# Patient Record
Sex: MALE | Race: WHITE | URBAN - METROPOLITAN AREA
[De-identification: names, ages, dates, MRNs, and addresses within clinical notes are randomized per-mention and may not be internally consistent; named-entity substitution may affect disease eponyms.]

---

## 2017-02-17 ENCOUNTER — HISTORICAL (OUTPATIENT)
Dept: LAB | Facility: HOSPITAL | Age: 77
End: 2017-02-17

## 2017-03-02 ENCOUNTER — HISTORICAL (OUTPATIENT)
Dept: RADIOLOGY | Facility: HOSPITAL | Age: 77
End: 2017-03-02

## 2017-08-11 ENCOUNTER — HISTORICAL (OUTPATIENT)
Dept: LAB | Facility: HOSPITAL | Age: 77
End: 2017-08-11

## 2017-08-11 LAB
ALBUMIN SERPL-MCNC: 4.1 GM/DL (ref 3.4–5)
ALBUMIN/GLOB SERPL: 1.3 RATIO (ref 1.1–2)
ALP SERPL-CCNC: 89 UNIT/L (ref 46–116)
ALT SERPL-CCNC: 33 UNIT/L (ref 12–78)
AST SERPL-CCNC: 20 UNIT/L (ref 15–37)
BILIRUB SERPL-MCNC: 1.1 MG/DL (ref 0.2–1)
BILIRUBIN DIRECT+TOT PNL SERPL-MCNC: 0.23 MG/DL (ref 0–0.2)
BILIRUBIN DIRECT+TOT PNL SERPL-MCNC: 0.87 MG/DL (ref 0–0.8)
BUN SERPL-MCNC: 22 MG/DL (ref 7–18)
CALCIUM SERPL-MCNC: 9.4 MG/DL (ref 8.5–10.1)
CHLORIDE SERPL-SCNC: 105 MMOL/L (ref 98–107)
CHOLEST SERPL-MCNC: 140 MG/DL (ref 0–200)
CHOLEST/HDLC SERPL: 2.9 {RATIO} (ref 0–5)
CO2 SERPL-SCNC: 28.9 MMOL/L (ref 21–32)
CREAT SERPL-MCNC: 1.35 MG/DL (ref 0.6–1.3)
EST. AVERAGE GLUCOSE BLD GHB EST-MCNC: 120 MG/DL
GLOBULIN SER-MCNC: 3.1 GM/DL (ref 2.4–3.5)
GLUCOSE SERPL-MCNC: 100 MG/DL (ref 74–106)
HBA1C MFR BLD: 5.8 % (ref 4.5–6.2)
HDLC SERPL-MCNC: 49 MG/DL (ref 40–60)
LDLC SERPL CALC-MCNC: 72 MG/DL (ref 0–129)
POTASSIUM SERPL-SCNC: 4.4 MMOL/L (ref 3.5–5.1)
PROT SERPL-MCNC: 7.2 GM/DL (ref 6.4–8.2)
SODIUM SERPL-SCNC: 142 MMOL/L (ref 136–145)
TRIGL SERPL-MCNC: 93 MG/DL
VLDLC SERPL CALC-MCNC: 19 MG/DL

## 2017-11-10 ENCOUNTER — HISTORICAL (OUTPATIENT)
Dept: LAB | Facility: HOSPITAL | Age: 77
End: 2017-11-10

## 2017-11-10 LAB
EST. AVERAGE GLUCOSE BLD GHB EST-MCNC: 114 MG/DL
HBA1C MFR BLD: 5.6 % (ref 4.5–6.2)
PSA SERPL-MCNC: 0.39 NG/ML (ref 0–4)

## 2018-02-15 ENCOUNTER — HISTORICAL (OUTPATIENT)
Dept: LAB | Facility: HOSPITAL | Age: 78
End: 2018-02-15

## 2018-02-15 LAB
ALBUMIN SERPL-MCNC: 3.9 GM/DL (ref 3.4–5)
ALBUMIN/GLOB SERPL: 1.1 RATIO (ref 1.1–2)
ALP SERPL-CCNC: 126 UNIT/L (ref 46–116)
ALT SERPL-CCNC: 16 UNIT/L (ref 12–78)
AST SERPL-CCNC: 19 UNIT/L (ref 15–37)
BILIRUB SERPL-MCNC: 0.9 MG/DL (ref 0.2–1)
BILIRUBIN DIRECT+TOT PNL SERPL-MCNC: 0.24 MG/DL (ref 0–0.2)
BILIRUBIN DIRECT+TOT PNL SERPL-MCNC: 0.65 MG/DL (ref 0–0.8)
BUN SERPL-MCNC: 26.8 MG/DL (ref 7–18)
CALCIUM SERPL-MCNC: 9.5 MG/DL (ref 8.5–10.1)
CHLORIDE SERPL-SCNC: 103 MMOL/L (ref 98–107)
CHOLEST SERPL-MCNC: 131 MG/DL (ref 0–200)
CHOLEST/HDLC SERPL: 2.8 {RATIO} (ref 0–5)
CO2 SERPL-SCNC: 29.1 MMOL/L (ref 21–32)
CREAT SERPL-MCNC: 1.39 MG/DL (ref 0.6–1.3)
EST. AVERAGE GLUCOSE BLD GHB EST-MCNC: 140 MG/DL
GLOBULIN SER-MCNC: 3.4 GM/DL (ref 2.4–3.5)
GLUCOSE SERPL-MCNC: 137 MG/DL (ref 74–106)
HBA1C MFR BLD: 6.5 % (ref 4.5–6.2)
HDLC SERPL-MCNC: 46 MG/DL (ref 40–60)
LDLC SERPL CALC-MCNC: 62 MG/DL (ref 0–129)
POTASSIUM SERPL-SCNC: 4.4 MMOL/L (ref 3.5–5.1)
PROT SERPL-MCNC: 7.3 GM/DL (ref 6.4–8.2)
SODIUM SERPL-SCNC: 140 MMOL/L (ref 136–145)
TRIGL SERPL-MCNC: 116 MG/DL
VLDLC SERPL CALC-MCNC: 23 MG/DL

## 2018-05-07 ENCOUNTER — HISTORICAL (OUTPATIENT)
Dept: LAB | Facility: HOSPITAL | Age: 78
End: 2018-05-07

## 2018-05-07 LAB
ABS NEUT (OLG): 8.1 X10(3)/MCL (ref 2.1–9.2)
ALBUMIN SERPL-MCNC: 3.5 GM/DL (ref 3.4–5)
ALBUMIN/GLOB SERPL: 0.9 RATIO (ref 1.1–2)
ALP SERPL-CCNC: 116 UNIT/L (ref 46–116)
ALT SERPL-CCNC: 17 UNIT/L (ref 12–78)
AST SERPL-CCNC: 16 UNIT/L (ref 15–37)
BASOPHILS NFR BLD AUTO: 0 % (ref 0–2)
BILIRUB SERPL-MCNC: 0.7 MG/DL (ref 0.2–1)
BILIRUBIN DIRECT+TOT PNL SERPL-MCNC: 0.19 MG/DL (ref 0–0.2)
BILIRUBIN DIRECT+TOT PNL SERPL-MCNC: 0.51 MG/DL (ref 0–0.8)
BUN SERPL-MCNC: 18.8 MG/DL (ref 7–18)
CALCIUM SERPL-MCNC: 9.6 MG/DL (ref 8.5–10.1)
CHLORIDE SERPL-SCNC: 101 MMOL/L (ref 98–107)
CO2 SERPL-SCNC: 26.3 MMOL/L (ref 21–32)
CREAT SERPL-MCNC: 1.33 MG/DL (ref 0.6–1.3)
EOSINOPHIL # BLD AUTO: 0.3 X10(3)/MCL
EOSINOPHIL NFR BLD AUTO: 2 %
ERYTHROCYTE [DISTWIDTH] IN BLOOD BY AUTOMATED COUNT: 12.7 % (ref 11.5–17)
GLOBULIN SER-MCNC: 3.7 GM/DL (ref 2.4–3.5)
GLUCOSE SERPL-MCNC: 148 MG/DL (ref 74–106)
HCT VFR BLD AUTO: 36.9 % (ref 42–52)
HGB BLD-MCNC: 12.3 GM/DL (ref 14–18)
LYMPHOCYTES # BLD AUTO: 1.9 X10(3)/MCL
LYMPHOCYTES NFR BLD AUTO: 17 % (ref 13–40)
MCH RBC QN AUTO: 29.3 PG (ref 27–31)
MCHC RBC AUTO-ENTMCNC: 33.3 GM/DL (ref 33–36)
MCV RBC AUTO: 87.9 FL (ref 80–94)
MONOCYTES # BLD AUTO: 1.3 X10(3)/MCL
MONOCYTES NFR BLD AUTO: 12 % (ref 2–11)
NEUTROPHILS # BLD AUTO: 8.1 X10(3)/MCL (ref 2.1–9.2)
NEUTROPHILS NFR BLD AUTO: 69 % (ref 47–80)
PLATELET # BLD AUTO: 248 X10(3)/MCL (ref 130–400)
PMV BLD AUTO: 7.7 FL (ref 7.4–10.4)
POTASSIUM SERPL-SCNC: 4.4 MMOL/L (ref 3.5–5.1)
PROT SERPL-MCNC: 7.2 GM/DL (ref 6.4–8.2)
RBC # BLD AUTO: 4.2 X10(6)/MCL (ref 4.7–6.1)
SODIUM SERPL-SCNC: 140 MMOL/L (ref 136–145)
WBC # SPEC AUTO: 11.7 X10(3)/MCL (ref 4.5–11.5)

## 2018-05-18 ENCOUNTER — HISTORICAL (OUTPATIENT)
Dept: RADIOLOGY | Facility: HOSPITAL | Age: 78
End: 2018-05-18

## 2018-06-04 ENCOUNTER — HISTORICAL (OUTPATIENT)
Dept: LAB | Facility: HOSPITAL | Age: 78
End: 2018-06-04

## 2018-06-04 LAB
ALBUMIN SERPL-MCNC: 3.2 GM/DL (ref 3.4–5)
ALBUMIN/GLOB SERPL: 0.8 RATIO (ref 1.1–2)
ALP SERPL-CCNC: 100 UNIT/L (ref 46–116)
ALT SERPL-CCNC: 17 UNIT/L (ref 12–78)
AST SERPL-CCNC: 17 UNIT/L (ref 15–37)
BILIRUB SERPL-MCNC: 0.7 MG/DL (ref 0.2–1)
BILIRUBIN DIRECT+TOT PNL SERPL-MCNC: 0.2 MG/DL (ref 0–0.2)
BILIRUBIN DIRECT+TOT PNL SERPL-MCNC: 0.5 MG/DL (ref 0–0.8)
BUN SERPL-MCNC: 13 MG/DL (ref 7–18)
CALCIUM SERPL-MCNC: 9.4 MG/DL (ref 8.5–10.1)
CHLORIDE SERPL-SCNC: 102 MMOL/L (ref 98–107)
CO2 SERPL-SCNC: 29.1 MMOL/L (ref 21–32)
CREAT SERPL-MCNC: 1.23 MG/DL (ref 0.6–1.3)
ERYTHROCYTE [DISTWIDTH] IN BLOOD BY AUTOMATED COUNT: 12.9 % (ref 11.5–17)
EST. AVERAGE GLUCOSE BLD GHB EST-MCNC: 146 MG/DL
GLOBULIN SER-MCNC: 3.9 GM/DL (ref 2.4–3.5)
GLUCOSE SERPL-MCNC: 122 MG/DL (ref 74–106)
HBA1C MFR BLD: 6.7 % (ref 4.5–6.2)
HCT VFR BLD AUTO: 34.3 % (ref 42–52)
HGB BLD-MCNC: 11.2 GM/DL (ref 14–18)
MCH RBC QN AUTO: 28.5 PG (ref 27–31)
MCHC RBC AUTO-ENTMCNC: 32.7 GM/DL (ref 33–36)
MCV RBC AUTO: 87 FL (ref 80–94)
PLATELET # BLD AUTO: 315 X10(3)/MCL (ref 130–400)
PMV BLD AUTO: 7.4 FL (ref 7.4–10.4)
POTASSIUM SERPL-SCNC: 4.5 MMOL/L (ref 3.5–5.1)
PROT SERPL-MCNC: 7.1 GM/DL (ref 6.4–8.2)
RBC # BLD AUTO: 3.94 X10(6)/MCL (ref 4.7–6.1)
SODIUM SERPL-SCNC: 140 MMOL/L (ref 136–145)
TSH SERPL-ACNC: 4.23 MIU/ML (ref 0.36–3.74)
WBC # SPEC AUTO: 11.8 X10(3)/MCL (ref 4.5–11.5)

## 2018-06-08 ENCOUNTER — HISTORICAL (OUTPATIENT)
Dept: CARDIOLOGY | Facility: HOSPITAL | Age: 78
End: 2018-06-08

## 2018-06-08 LAB
APTT PPP: 36.8 SECOND(S) (ref 24.8–36.9)
INR PPP: 1.17 (ref 0–1.27)
PROTHROMBIN TIME: 15.3 SECOND(S) (ref 12.2–14.7)

## 2018-06-19 ENCOUNTER — HISTORICAL (OUTPATIENT)
Dept: ADMINISTRATIVE | Facility: HOSPITAL | Age: 78
End: 2018-06-19

## 2018-06-19 LAB
ABS NEUT (OLG): 8.32 X10(3)/MCL (ref 2.1–9.2)
ALBUMIN SERPL-MCNC: 3.2 GM/DL (ref 3.4–5)
ALBUMIN/GLOB SERPL: 0.9 {RATIO}
ALP SERPL-CCNC: 97 UNIT/L (ref 50–136)
ALT SERPL-CCNC: 23 UNIT/L (ref 12–78)
AST SERPL-CCNC: 17 UNIT/L (ref 15–37)
BASOPHILS # BLD AUTO: 0 X10(3)/MCL (ref 0–0.2)
BASOPHILS NFR BLD AUTO: 0.2 %
BILIRUB SERPL-MCNC: 0.7 MG/DL (ref 0.2–1)
BILIRUBIN DIRECT+TOT PNL SERPL-MCNC: 0.2 MG/DL (ref 0–0.2)
BILIRUBIN DIRECT+TOT PNL SERPL-MCNC: 0.5 MG/DL (ref 0–0.8)
BUN SERPL-MCNC: 17 MG/DL (ref 7–18)
CALCIUM SERPL-MCNC: 9.6 MG/DL (ref 8.5–10.1)
CHLORIDE SERPL-SCNC: 103 MMOL/L (ref 98–107)
CO2 SERPL-SCNC: 27 MMOL/L (ref 21–32)
CREAT SERPL-MCNC: 1.11 MG/DL (ref 0.7–1.3)
EOSINOPHIL # BLD AUTO: 0.1 X10(3)/MCL (ref 0–0.9)
EOSINOPHIL NFR BLD AUTO: 1 %
ERYTHROCYTE [DISTWIDTH] IN BLOOD BY AUTOMATED COUNT: 12.8 % (ref 11.5–17)
GLOBULIN SER-MCNC: 3.6 GM/DL (ref 2.4–3.5)
GLUCOSE SERPL-MCNC: 108 MG/DL (ref 74–106)
HCT VFR BLD AUTO: 36.9 % (ref 42–52)
HGB BLD-MCNC: 11 GM/DL (ref 14–18)
LDH SERPL-CCNC: 281 UNIT/L (ref 87–241)
LYMPHOCYTES # BLD AUTO: 1.8 X10(3)/MCL (ref 0.6–4.6)
LYMPHOCYTES NFR BLD AUTO: 15.6 %
MAGNESIUM SERPL-MCNC: 2.2 MG/DL (ref 1.8–2.4)
MCH RBC QN AUTO: 27.6 PG (ref 27–31)
MCHC RBC AUTO-ENTMCNC: 29.8 GM/DL (ref 33–36)
MCV RBC AUTO: 92.7 FL (ref 80–94)
MONOCYTES # BLD AUTO: 1.2 X10(3)/MCL (ref 0.1–1.3)
MONOCYTES NFR BLD AUTO: 10.9 %
NEUTROPHILS # BLD AUTO: 8.3 X10(3)/MCL (ref 2.1–9.2)
NEUTROPHILS NFR BLD AUTO: 72.3 %
PHOSPHATE SERPL-MCNC: 3.8 MG/DL (ref 2.5–4.9)
PLATELET # BLD AUTO: 264 X10(3)/MCL (ref 130–400)
PMV BLD AUTO: 9.9 FL (ref 9.4–12.4)
POTASSIUM SERPL-SCNC: 4.1 MMOL/L (ref 3.5–5.1)
PROT SERPL-MCNC: 6.8 GM/DL (ref 6.4–8.2)
RBC # BLD AUTO: 3.98 X10(6)/MCL (ref 4.7–6.1)
SODIUM SERPL-SCNC: 142 MMOL/L (ref 136–145)
URATE SERPL-MCNC: 6.5 MG/DL (ref 2.6–7.2)
WBC # SPEC AUTO: 11.5 X10(3)/MCL (ref 4.5–11.5)

## 2018-06-21 ENCOUNTER — HISTORICAL (OUTPATIENT)
Dept: LAB | Facility: HOSPITAL | Age: 78
End: 2018-06-21

## 2018-06-21 ENCOUNTER — HISTORICAL (OUTPATIENT)
Dept: ADMINISTRATIVE | Facility: HOSPITAL | Age: 78
End: 2018-06-21

## 2018-06-21 LAB
ABS NEUT (OLG): 6.53 X10(3)/MCL (ref 2.1–9.2)
BASOPHILS # BLD AUTO: 0 X10(3)/MCL (ref 0–0.2)
BASOPHILS NFR BLD AUTO: 0 %
EOSINOPHIL # BLD AUTO: 0.1 X10(3)/MCL (ref 0–0.9)
EOSINOPHIL NFR BLD AUTO: 1 %
ERYTHROCYTE [DISTWIDTH] IN BLOOD BY AUTOMATED COUNT: 12.5 % (ref 11.5–17)
HCT VFR BLD AUTO: 30.2 % (ref 42–52)
HGB BLD-MCNC: 9.5 GM/DL (ref 14–18)
LYMPHOCYTES # BLD AUTO: 1.4 X10(3)/MCL (ref 0.6–4.6)
LYMPHOCYTES NFR BLD AUTO: 16 %
MCH RBC QN AUTO: 28.6 PG (ref 27–31)
MCHC RBC AUTO-ENTMCNC: 31.5 GM/DL (ref 33–36)
MCV RBC AUTO: 91 FL (ref 80–94)
MONOCYTES # BLD AUTO: 1 X10(3)/MCL (ref 0.1–1.3)
MONOCYTES NFR BLD AUTO: 11 %
NEUTROPHILS # BLD AUTO: 6.53 X10(3)/MCL (ref 1.4–7.9)
NEUTROPHILS NFR BLD AUTO: 72 %
PLATELET # BLD AUTO: 260 X10(3)/MCL (ref 130–400)
PMV BLD AUTO: 9.4 FL (ref 9.4–12.4)
RBC # BLD AUTO: 3.32 X10(6)/MCL (ref 4.7–6.1)
WBC # SPEC AUTO: 9.1 X10(3)/MCL (ref 4.5–11.5)

## 2018-06-25 ENCOUNTER — HISTORICAL (OUTPATIENT)
Dept: RADIOLOGY | Facility: HOSPITAL | Age: 78
End: 2018-06-25

## 2018-07-02 ENCOUNTER — HISTORICAL (OUTPATIENT)
Dept: RADIATION THERAPY | Facility: HOSPITAL | Age: 78
End: 2018-07-02

## 2018-07-05 ENCOUNTER — HISTORICAL (OUTPATIENT)
Dept: INFUSION THERAPY | Facility: HOSPITAL | Age: 78
End: 2018-07-05

## 2018-07-06 ENCOUNTER — HISTORICAL (OUTPATIENT)
Dept: INFUSION THERAPY | Facility: HOSPITAL | Age: 78
End: 2018-07-06

## 2018-08-01 ENCOUNTER — HISTORICAL (OUTPATIENT)
Dept: ADMINISTRATIVE | Facility: HOSPITAL | Age: 78
End: 2018-08-01

## 2018-08-03 LAB
ABS NEUT (OLG): 10.45 X10(3)/MCL (ref 2.1–9.2)
ALBUMIN SERPL-MCNC: 3.6 GM/DL (ref 3.4–5)
ALBUMIN/GLOB SERPL: 1.6 RATIO (ref 1.1–2)
ALP SERPL-CCNC: 113 UNIT/L (ref 46–116)
ALT SERPL-CCNC: 18 UNIT/L (ref 12–78)
AST SERPL-CCNC: 18 UNIT/L (ref 15–37)
BASOPHILS # BLD AUTO: 0 X10(3)/MCL (ref 0–0.2)
BASOPHILS NFR BLD AUTO: 0 %
BILIRUB SERPL-MCNC: 0.7 MG/DL (ref 0.2–1)
BILIRUBIN DIRECT+TOT PNL SERPL-MCNC: 0.21 MG/DL (ref 0–0.2)
BILIRUBIN DIRECT+TOT PNL SERPL-MCNC: 0.49 MG/DL (ref 0–0.8)
BUN SERPL-MCNC: 37.3 MG/DL (ref 7–18)
CALCIUM SERPL-MCNC: 9 MG/DL (ref 8.5–10.1)
CHLORIDE SERPL-SCNC: 105 MMOL/L (ref 98–107)
CO2 SERPL-SCNC: 20.7 MMOL/L (ref 21–32)
CREAT SERPL-MCNC: 1.37 MG/DL (ref 0.6–1.3)
CRP SERPL HS-MCNC: 60.8 MG/L (ref 0–3)
EOSINOPHIL # BLD AUTO: 0.1 X10(3)/MCL (ref 0–0.9)
EOSINOPHIL NFR BLD AUTO: 1 %
ERYTHROCYTE [DISTWIDTH] IN BLOOD BY AUTOMATED COUNT: 17.4 % (ref 11.5–17)
ERYTHROCYTE [SEDIMENTATION RATE] IN BLOOD: 8 MM/HR (ref 0–15)
GLOBULIN SER-MCNC: 2.3 GM/DL (ref 2.4–3.5)
GLUCOSE SERPL-MCNC: 130 MG/DL (ref 74–106)
HCT VFR BLD AUTO: 32.2 % (ref 42–52)
HGB BLD-MCNC: 10.6 GM/DL (ref 14–18)
LYMPHOCYTES # BLD AUTO: 1.5 X10(3)/MCL (ref 0.6–4.6)
LYMPHOCYTES NFR BLD AUTO: 11 %
MCH RBC QN AUTO: 28 PG (ref 27–31)
MCHC RBC AUTO-ENTMCNC: 32.9 GM/DL (ref 33–36)
MCV RBC AUTO: 85 FL (ref 80–94)
MONOCYTES # BLD AUTO: 1.7 X10(3)/MCL (ref 0.1–1.3)
MONOCYTES NFR BLD AUTO: 12 %
NEUTROPHILS # BLD AUTO: 10.45 X10(3)/MCL (ref 1.4–7.9)
NEUTROPHILS NFR BLD AUTO: 75 %
PLATELET # BLD AUTO: 83 X10(3)/MCL (ref 130–400)
PMV BLD AUTO: 11.2 FL (ref 9.4–12.4)
POTASSIUM SERPL-SCNC: 4 MMOL/L (ref 3.5–5.1)
PROT SERPL-MCNC: 5.9 GM/DL (ref 6.4–8.2)
RBC # BLD AUTO: 3.79 X10(6)/MCL (ref 4.7–6.1)
SODIUM SERPL-SCNC: 138 MMOL/L (ref 136–145)
WBC # SPEC AUTO: 13.8 X10(3)/MCL (ref 4.5–11.5)

## 2018-08-04 LAB
ABS NEUT (OLG): 9.11 X10(3)/MCL (ref 2.1–9.2)
ALBUMIN SERPL-MCNC: 3.4 GM/DL (ref 3.4–5)
ALBUMIN/GLOB SERPL: 1.4 RATIO (ref 1.1–2)
ALP SERPL-CCNC: 102 UNIT/L (ref 46–116)
ALT SERPL-CCNC: 18 UNIT/L (ref 12–78)
AST SERPL-CCNC: 13 UNIT/L (ref 15–37)
BASOPHILS # BLD AUTO: 0 X10(3)/MCL (ref 0–0.2)
BASOPHILS NFR BLD AUTO: 0 %
BILIRUB SERPL-MCNC: 0.6 MG/DL (ref 0.2–1)
BILIRUBIN DIRECT+TOT PNL SERPL-MCNC: 0.19 MG/DL (ref 0–0.2)
BILIRUBIN DIRECT+TOT PNL SERPL-MCNC: 0.41 MG/DL (ref 0–0.8)
BUN SERPL-MCNC: 32 MG/DL (ref 7–18)
CALCIUM SERPL-MCNC: 8.9 MG/DL (ref 8.5–10.1)
CHLORIDE SERPL-SCNC: 106 MMOL/L (ref 98–107)
CO2 SERPL-SCNC: 21.7 MMOL/L (ref 21–32)
CREAT SERPL-MCNC: 1.08 MG/DL (ref 0.6–1.3)
CRP SERPL HS-MCNC: 61.67 MG/L (ref 0–3)
EOSINOPHIL # BLD AUTO: 0.1 X10(3)/MCL (ref 0–0.9)
EOSINOPHIL NFR BLD AUTO: 1 %
ERYTHROCYTE [DISTWIDTH] IN BLOOD BY AUTOMATED COUNT: 17.6 % (ref 11.5–17)
GLOBULIN SER-MCNC: 2.4 GM/DL (ref 2.4–3.5)
GLUCOSE SERPL-MCNC: 148 MG/DL (ref 74–106)
HCT VFR BLD AUTO: 31.2 % (ref 42–52)
HGB BLD-MCNC: 9.8 GM/DL (ref 14–18)
IMM GRANULOCYTES # BLD AUTO: 0.05 % (ref 0–0.02)
IMM GRANULOCYTES NFR BLD AUTO: 0.4 % (ref 0–0.43)
LYMPHOCYTES # BLD AUTO: 1.3 X10(3)/MCL (ref 0.6–4.6)
LYMPHOCYTES NFR BLD AUTO: 11 %
MAGNESIUM SERPL-MCNC: 1.8 MG/DL (ref 1.8–2.4)
MCH RBC QN AUTO: 27.1 PG (ref 27–31)
MCHC RBC AUTO-ENTMCNC: 31.4 GM/DL (ref 33–36)
MCV RBC AUTO: 86.4 FL (ref 80–94)
MONOCYTES # BLD AUTO: 1.2 X10(3)/MCL (ref 0.1–1.3)
MONOCYTES NFR BLD AUTO: 10 %
NEUTROPHILS # BLD AUTO: 9.11 X10(3)/MCL (ref 1.4–7.9)
NEUTROPHILS NFR BLD AUTO: 77 %
PHOSPHATE SERPL-MCNC: 3.4 MG/DL (ref 2.5–4.9)
PLATELET # BLD AUTO: 89 X10(3)/MCL (ref 130–400)
PMV BLD AUTO: 11.4 FL (ref 9.4–12.4)
POTASSIUM SERPL-SCNC: 3.6 MMOL/L (ref 3.5–5.1)
PROT SERPL-MCNC: 5.8 GM/DL (ref 6.4–8.2)
RBC # BLD AUTO: 3.61 X10(6)/MCL (ref 4.7–6.1)
SODIUM SERPL-SCNC: 138 MMOL/L (ref 136–145)
WBC # SPEC AUTO: 11.8 X10(3)/MCL (ref 4.5–11.5)

## 2018-08-05 LAB
ABS NEUT (OLG): 10.73 X10(3)/MCL (ref 2.1–9.2)
ALBUMIN SERPL-MCNC: 3.5 GM/DL (ref 3.4–5)
ALBUMIN/GLOB SERPL: 1.5 RATIO (ref 1.1–2)
ALP SERPL-CCNC: 109 UNIT/L (ref 46–116)
ALT SERPL-CCNC: 18 UNIT/L (ref 12–78)
AST SERPL-CCNC: 17 UNIT/L (ref 15–37)
BASOPHILS # BLD AUTO: 0 X10(3)/MCL (ref 0–0.2)
BASOPHILS NFR BLD AUTO: 0 %
BILIRUB SERPL-MCNC: 0.7 MG/DL (ref 0.2–1)
BILIRUBIN DIRECT+TOT PNL SERPL-MCNC: 0.25 MG/DL (ref 0–0.2)
BILIRUBIN DIRECT+TOT PNL SERPL-MCNC: 0.45 MG/DL (ref 0–0.8)
BUN SERPL-MCNC: 30 MG/DL (ref 7–18)
CALCIUM SERPL-MCNC: 9 MG/DL (ref 8.5–10.1)
CHLORIDE SERPL-SCNC: 107 MMOL/L (ref 98–107)
CO2 SERPL-SCNC: 23.6 MMOL/L (ref 21–32)
CREAT SERPL-MCNC: 1.15 MG/DL (ref 0.6–1.3)
EOSINOPHIL # BLD AUTO: 0 X10(3)/MCL (ref 0–0.9)
EOSINOPHIL NFR BLD AUTO: 0 %
ERYTHROCYTE [DISTWIDTH] IN BLOOD BY AUTOMATED COUNT: 18.2 % (ref 11.5–17)
GLOBULIN SER-MCNC: 2.3 GM/DL (ref 2.4–3.5)
GLUCOSE SERPL-MCNC: 176 MG/DL (ref 74–106)
HCT VFR BLD AUTO: 33.7 % (ref 42–52)
HGB BLD-MCNC: 10.8 GM/DL (ref 14–18)
IMM GRANULOCYTES # BLD AUTO: 0.08 % (ref 0–0.02)
IMM GRANULOCYTES NFR BLD AUTO: 0.6 % (ref 0–0.43)
LYMPHOCYTES # BLD AUTO: 1.5 X10(3)/MCL (ref 0.6–4.6)
LYMPHOCYTES NFR BLD AUTO: 11 %
MCH RBC QN AUTO: 27.3 PG (ref 27–31)
MCHC RBC AUTO-ENTMCNC: 32 GM/DL (ref 33–36)
MCV RBC AUTO: 85.1 FL (ref 80–94)
MONOCYTES # BLD AUTO: 1.7 X10(3)/MCL (ref 0.1–1.3)
MONOCYTES NFR BLD AUTO: 12 %
NEUTROPHILS # BLD AUTO: 10.73 X10(3)/MCL (ref 1.4–7.9)
NEUTROPHILS NFR BLD AUTO: 76 %
PLATELET # BLD AUTO: 97 X10(3)/MCL (ref 130–400)
PMV BLD AUTO: 10.7 FL (ref 9.4–12.4)
POTASSIUM SERPL-SCNC: 3.5 MMOL/L (ref 3.5–5.1)
PROT SERPL-MCNC: 5.8 GM/DL (ref 6.4–8.2)
RBC # BLD AUTO: 3.96 X10(6)/MCL (ref 4.7–6.1)
SODIUM SERPL-SCNC: 141 MMOL/L (ref 136–145)
WBC # SPEC AUTO: 14.1 X10(3)/MCL (ref 4.5–11.5)

## 2018-08-06 LAB
ABS NEUT (OLG): 12.58 X10(3)/MCL (ref 2.1–9.2)
ALBUMIN SERPL-MCNC: 3.8 GM/DL (ref 3.4–5)
ALBUMIN/GLOB SERPL: 1.5 RATIO (ref 1.1–2)
ALP SERPL-CCNC: 124 UNIT/L (ref 46–116)
ALT SERPL-CCNC: 25 UNIT/L (ref 12–78)
AST SERPL-CCNC: 13 UNIT/L (ref 15–37)
BASOPHILS # BLD AUTO: 0 X10(3)/MCL (ref 0–0.2)
BASOPHILS NFR BLD AUTO: 0 %
BILIRUB SERPL-MCNC: 0.8 MG/DL (ref 0.2–1)
BILIRUBIN DIRECT+TOT PNL SERPL-MCNC: 0.23 MG/DL (ref 0–0.2)
BILIRUBIN DIRECT+TOT PNL SERPL-MCNC: 0.57 MG/DL (ref 0–0.8)
BUN SERPL-MCNC: 39.9 MG/DL (ref 7–18)
CALCIUM SERPL-MCNC: 9.6 MG/DL (ref 8.5–10.1)
CHLORIDE SERPL-SCNC: 111 MMOL/L (ref 98–107)
CO2 SERPL-SCNC: 21.4 MMOL/L (ref 21–32)
CREAT SERPL-MCNC: 1.32 MG/DL (ref 0.6–1.3)
CRP SERPL HS-MCNC: 54.15 MG/L (ref 0–3)
EOSINOPHIL # BLD AUTO: 0 X10(3)/MCL (ref 0–0.9)
EOSINOPHIL NFR BLD AUTO: 0 %
ERYTHROCYTE [DISTWIDTH] IN BLOOD BY AUTOMATED COUNT: 18.8 % (ref 11.5–17)
GLOBULIN SER-MCNC: 2.5 GM/DL (ref 2.4–3.5)
GLUCOSE SERPL-MCNC: 180 MG/DL (ref 74–106)
HCT VFR BLD AUTO: 37.2 % (ref 42–52)
HGB BLD-MCNC: 11.7 GM/DL (ref 14–18)
IMM GRANULOCYTES # BLD AUTO: 0.13 % (ref 0–0.02)
IMM GRANULOCYTES NFR BLD AUTO: 0.8 % (ref 0–0.43)
LACTATE SERPL-SCNC: 2.6 MMOL/L (ref 0.4–2)
LYMPHOCYTES # BLD AUTO: 2 X10(3)/MCL (ref 0.6–4.6)
LYMPHOCYTES NFR BLD AUTO: 12 %
MAGNESIUM SERPL-MCNC: 2 MG/DL (ref 1.8–2.4)
MCH RBC QN AUTO: 27.2 PG (ref 27–31)
MCHC RBC AUTO-ENTMCNC: 31.5 GM/DL (ref 33–36)
MCV RBC AUTO: 86.5 FL (ref 80–94)
MONOCYTES # BLD AUTO: 1.8 X10(3)/MCL (ref 0.1–1.3)
MONOCYTES NFR BLD AUTO: 11 %
NEUTROPHILS # BLD AUTO: 12.58 X10(3)/MCL (ref 1.4–7.9)
NEUTROPHILS NFR BLD AUTO: 76 %
PLATELET # BLD AUTO: 121 X10(3)/MCL (ref 130–400)
PMV BLD AUTO: 11 FL (ref 9.4–12.4)
POTASSIUM SERPL-SCNC: 3.9 MMOL/L (ref 3.5–5.1)
PROT SERPL-MCNC: 6.3 GM/DL (ref 6.4–8.2)
RBC # BLD AUTO: 4.3 X10(6)/MCL (ref 4.7–6.1)
SODIUM SERPL-SCNC: 145 MMOL/L (ref 136–145)
TROPONIN I SERPL-MCNC: 0.03 NG/ML (ref 0.02–0.06)
WBC # SPEC AUTO: 16.5 X10(3)/MCL (ref 4.5–11.5)

## 2018-08-08 LAB
FINAL CULTURE: NORMAL
FINAL CULTURE: NORMAL

## 2018-08-12 LAB
FINAL CULTURE: NORMAL
FINAL CULTURE: NORMAL

## 2022-04-30 NOTE — CONSULTS
Patient:   Jermaine Abdalla            MRN: [id number]            FIN: [id number]               Age:   78 years     Sex:  Male     :  1940   Associated Diagnoses:   None   Author:   Remigio Avina.      Chief Complaint   Wide complex tachycardia      History of Present Illness   This is a 78-year-old  male known to [facility]- Dr Kaye who was admitted to the hospital for small bowel obstruction where he was found to have cholelithiasis.  As there were no beds in the [facility] he was transferred to [facility] in [place] where he subsequently underwent laparoscopic cholecystectomy which turned into an open small bowel resection due to ischemia.  His hospital course there was complicated by A. fib with RVR, inappropriate discharge of his defibrillator, and respiratory failure.  He was transferred to [facility] on 2018 for swing bed care.  Today, [facility] cardiology has been consulted for wide complex tachycardia.  Patient with increasing WBC along with altered mental status.  At the time of this consultation the patient was actively being transferred to [facility] for a higher level of care.    Upon speaking with the patient he denies any complaints of chest pain or shortness of breath but when explicitly asked him is anything hurting him he shakes his head yes and then points to his chest and abdomen.  He has had altered mental status which according to the nursing staff and the family has been getting progressively worse.  Does have an elevated inflammatory markers and there is some suspicion that the patient may becoming septic.  Review of telemetry strips show patient to be in sinus rhythm with approximately 1 minute run of what appears to be supraventricular tachycardia it is wide complex there are clearly discernible P waves throughout the rate is approximately 150 bpm.  He quickly converted back to sinus rhythm.  At the time of examination he was  on amiodarone drip at 1 mg/min as it was felt the wide complex tachycardia may represent an underlying ventricular dysrhythmia.      Review of Systems   limited due to pt condition      Health Status   Allergies:    Allergic Reactions (Selected)  No Known Allergies   Current medications:    Medications (17) Active  Scheduled: (6)  bacitracin top 500 u/gm Oin 15 gm  1 reji, TOP, Daily  duloxetine HCL 30mg Cap  30 mg 1 cap(s), Oral, Daily  guaifenesin 600 mg CR  1,200 mg 2 tab(s), Oral, BID  ipratropium 0.02% Sol 2.5ml UD  0.5 mg 2.5 mL, NEB, q6hr Resp  metoprolol tart. 25 mg Tab UD  50 mg 2 tab(s), PEG Tube, BID  pantoprazole 40 mg EC Tab  40 mg 1 tab(s), Oral, Daily  Continuous: (2)  amiodarone 360 mg +  250 mL, IV, 33.3 mL/hr  D5W-1/2NS 1,000 mL  1,000 mL, IV, 125 mL/hr  PRN: (9)  acetaminophen 325 mg Tab  650 mg 2 tab(s), Oral, q6hr  dextrose 50% vial 50 ml  12.5 gm 25 mL, IV Push, As Directed  dextrose 50% vial 50 ml  12.5 gm 25 mL, IV Push, Once  dextrose 50% vial 50 ml  12.5 gm 25 mL, IV Push, As Directed  dextrose 50% vial 50 ml  25 gm 50 mL, IV Push, As Directed  glucagon recombinant 1 mg Inj  1 mg 1 EA, IM, q10min  hydrocodone 7.5mg/APAP 325mg  1 tab(s), Oral, q6hr  insulin (Humalog) lispro 100 u/ml Inj  2-14 units, Subcutaneous, As Directed  metoprolol 1 mg/ml Inj-5 ml  10 mg 10 mL, IV Push, q4hr        Histories   Past Medical History:    Active  able to lie flat ([id number])  Cataract ([id number])  Chronic back pain (462121228)  heart disease-pacemaker/defibrillator ([id number])  Diabetes ([id number])  Comments:  6/25/2012 CDT 7:49 CDT - Patricia Lima RN  states does not check CBG at home    6/21/2018 CDT 10:12 CDT - Lilibeth KELLEY, Valerie MASON  denies history of diabetes  Elevated cholesterol ([id number])  Hypertension (82160759)  Wears glasses (211437321)  Resolved  Cardiomyopathy (889687130):  Resolved.  Cardiovascular disease  ([id number]):  Resolved.  Colonoscopy ([id number]):  Resolved.  Defibrillator (28415502):  Resolved.  Pacemaker ([id number]):  Resolved.   Family History:    Entire family history is negative.   Procedure history:    PEG Tube Insertion Initial performed by Isiah Hammonds MD on 7/26/2018 at 78 Years.  Comments:  7/26/2018 19:11 - Jena Watson RN  auto-populated from documented surgical case  Cholangiogram (Surgery) performed by Isiah Hammonds MD on 7/14/2018 at 78 Years.  Comments:  7/14/2018 15:14 - Minda Chauhan RN  auto-populated from documented surgical case  Cholecystectomy Laparoscopic performed by Isiah Hammonds MD on 7/14/2018 at 78 Years.  Comments:  7/14/2018 15:14 - Minda Chauhan RN  auto-populated from documented surgical case  Small Bowel Resection performed by Isiah Hammonds MD on 7/14/2018 at 78 Years.  Comments:  7/14/2018 15:14 - Minda Chauhan RN  auto-populated from documented surgical case  Biopsy Bone Marrow Aspiration (.) performed by Tracey Upton MD on 6/21/2018 at 78 Years.  Comments:  6/21/2018 11:40 - Jose J Song RN  auto-populated from documented surgical case  Colonoscopy performed by CHAVEZ Josue MD on 9/8/2015 at 75 Years.  Comments:  9/8/2015 08:31 - Tg Ceron RN  auto-populated from documented surgical case  Polypectomy performed by CHAVEZ Josue MD on 9/8/2015 at 75 Years.  Comments:  9/8/2015 08:31 - Tg Ceron RN  auto-populated from documented surgical case  Remove and Replace ICD Gen Dual Lead performed by Bayron Stevens MD on 4/17/2014 at 74 Years.  Comments:  4/17/2014 10:47 - Katerin Collado RN  auto-populated from documented surgical case  pacemaker/defibrillator in 2006 at 66 Years.  back surgery in 1977 at 37 Years.  Appendectomy (SNOMED CT 618407002).   Social History        Social & Psychosocial Habits    Alcohol  06/21/2012 Risk Assessment: Denies  Alcohol Use    09/08/2015  Use: Past    Type: Beer    Comment: stopped 8-9 years - 09/08/2015 06:50 - Jag KELLEY, Meka MASON    Employment/School  06/21/2012  Highest education: High school    Home/Environment  06/25/2012  Lives with: Spouse    Living situation: Home/Independent    Substance Abuse  06/08/2018  Use: Never    Tobacco  06/21/2012 Risk Assessment: Denies Tobacco Use    06/08/2018  Use: Never smoker  .        Physical Examination   General:  Mild distress, alert, not oriented, lethargic.    Eye:  Pupils are equal, round and reactive to light.    HENT:  Normocephalic.    Neck:  Supple.    Respiratory:  Respirations are non-labored, Breath sounds are equal.    Cardiovascular:  Normal rate, Regular rhythm, Normal peripheral perfusion.    Gastrointestinal:  Non-distended, liquid BP; Hypoactive sounds.       Vital Signs (last 24 hrs)_____  Last Charted___________  Temp Oral     36.7 DegC  (AUG 06 12:00)  Heart Rate Peripheral   99 bpm  (AUG 06 12:00)  Resp Rate         18 br/min  (AUG 06 09:00)  SBP      136 mmHg  (AUG 06 12:00)  DBP      71 mmHg  (AUG 06 12:00)  SpO2      96 %  (AUG 06 12:00)  Weight      74.5 kg  (AUG 06 04:00)     Musculoskeletal:  No swelling, No deformity.    Integumentary:  Warm, Intact.    Neurologic:  Alert.       Review / Management   Results review:  All Results   8/6/2018 5:55 CDT        WBC                       16.5 x10(3)/mcL  HI                             RBC                       4.30 x10(6)/mcL  LOW                             Hgb                       11.7 gm/dL  LOW                             Hct                       37.2 %  LOW                             Platelet                  121 x10(3)/mcL  LOW                             MCV                       86.5 fL                             MCH                       27.2 pg                             MCHC                      31.5 gm/dL  LOW                             RDW                       18.8 %  HI                              MPV                       11.0 fL                             Abs Neut                  12.58 x10(3)/mcL  HI                             Neutro Auto               76 %  NA                             Lymph Auto                12 %  NA                             Mono Auto                 11 %  NA                             Eos Auto                  0 %  NA                             Abs Eos                   0.0 x10(3)/mcL                             Basophil Auto             0 %  NA                             Abs Neutro                12.58 x10(3)/mcL  HI                             Abs Lymph                 2.0 x10(3)/mcL                             Abs Mono                  1.8 x10(3)/mcL  HI                             Abs Baso                  0.0 x10(3)/mcL                             IG%                       0.800 %  HI                             IG#                       0.1300 %  HI                             Sodium Lvl                145 mmol/L                             Potassium Lvl             3.9 mmol/L                             Chloride                  111 mmol/L  HI                             CO2                       21.4 mmol/L                             Calcium Lvl               9.6 mg/dL                             Magnesium Lvl             2.0 mg/dL                             Glucose Lvl               180 mg/dL  HI                             BUN                       39.9 mg/dL  HI                             Creatinine                1.32 mg/dL  HI                             eGFR-AA                   >60 mL/min/1.73 m2  NA                             eGFR-KELLY                  56 mL/min/1.73 m2  NA                             Bili Total                0.8 mg/dL                             Bili Direct               0.23 mg/dL  HI                             Bili Indirect             0.57 mg/dL                             AST                       13 unit/L  LOW                              ALT                       25 unit/L                             Alk Phos                  124 unit/L  HI                             Total Protein             6.3 gm/dL  LOW                             Albumin Lvl               3.80 gm/dL                             Globulin                  2.50 gm/dL                             A/G Ratio                 1.5 ratio                             CRP High Sens             54.15 mg/L  HI  .    Radiology results   Rad Results (ST)   Accession: [id number]  Order: XR Chest 1 View  Report Dt/Tm: 08/06/2018 13:53  Report:   one view of the chest     CPT 58242     HISTORY:  Cough     FINDINGS:  Examination reveals a poor inspiratory effort mediastinal silhouette  is within normal limits cardiac silhouette is nonenlarged lung fields  are clear and free of gross infiltrates atelectases or effusions     IMPRESSION: No active pulmonary disease     Poor inspiratory effort      Accession: [id number]  Order: XR Abdomen KUB 1 View  Report Dt/Tm: 08/06/2018 10:47  Report:   KUB X-RAY:     CPT 51390     History:  PSBO;Other (please specify)     Findings:  Examination reveals persistent as is distention of the abdomen with  gas in loops of large and small bowel small bowel loops aren't dilated  and there is passage of contrast into the colon     IMPRESSION: Persistent gaseous distention         Cardiology Results   6.06.2018     1. The study quality is average.   2. The left ventricle is normal in size.Global left ventricular systolic function is moderately decreased. The left ventricular ejection fraction is 35-40%.   3. A linear echo density is noted in the right ventricle, suggestive of a pacemaker/ICD lead.   4. Mild calcification of the aortic valve is noted with adequate cuspal excursion.   5. The pulmonary artery systolic pressure is 16 mmHg. The pulmonary artery appears to be normal.     1. Ejection fraction has increased from 25% to 40%.   2. Mitral valve  area by pressure halftime has increased from 3.01 cm² to 5.6 cm².   3. Aortic root diameter essentially remained unchanged (3.9 cm previous study, 3.6 cm current study).               Cardiac Monitor:  At  8/6/2018 15:33:00, Rate  88 beats per minute, Reveals a Normal sinus rhythm, PVCs.       Impression and Plan   Wide Complex Tachycardia- Appears SVT   Post Operative Afib with RVR    [id number]  will need oac in future  Leukocytosis  Altered mental status  Recent SBO   Small bowel resection  CMO EF -35%  ICD- [facility]- Dual chamber  DVT  Lymphoma    Plan:  Pt with wide complex tachycardia- strips reviewed appears SVT- started on amiodarone  Hypotension- currently getting fluid bolus  Pt with increasing WBC-  ?Sepsis  recent SBO and surgery    pt being transferred to [facility] for higher level of care and evaluation

## 2022-04-30 NOTE — DISCHARGE SUMMARY
Patient:   Jermaine Abdalla            MRN: [id number]            FIN: [id number]               Age:   78 years     Sex:  Male     :  1940   Associated Diagnoses:   None   Author:   Ryan Claros MD      History of Present Illness   CC:Apathy  pt appears cj very apathetic; wife states he has been that way most of his life but it has been worse since his illness.  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 The patient had a section of small bowel resected.  It was about a foot long according to dr Hammonds operative notes;  He then had a PEG placed on : he has had progressive platelet reduction but has been on zyvox. He seems to be very sensitive to pain;  they have not been using his PEG but he has been on surgical liquids and clinimix prior to arriving. He has a  history of diabetes and diffuse large B-cell lymphoma, also with a history of AFib and SVT.  His defibrillator went off while their.  He did develop metabolic acidosis.  He did have significant hypotension for a prolonged period of time.  As a result, he has developed FANNY due to ischemic ATN. this has since stabilized; with meds and care. He is a candidate for further treatment of his lymphoma if his performance status improves.    SUMMARY: pt doing same; tolerating tube feedings up to 45cc/hr but noted not above this level; still apathetic; noted increase Creatiine; no fever; pt denies abd pain changes; no BM yet; periods of tachycardia; pt has not been on telemetry; noted increased WBC;      Date of Service: : CC: Tachycardia/AMS: pt has glazed look but does respond; noted increased creatinine and WBC without fever; CT negativer for perforation or abscess; unable to perform indium scan here; His platelets have  rebounded since stopping zyvox.  repeat KUB shows no new issues; he had increased residuals over last 48 hours;       Health Status   Current medications:  (Selected)   Inpatient Medications  Ordered  Dextrose 50% and Water  (50 mL vial/syringe): 25 mL, 12.5 gm =, form: Injection, IV Push, As Directed PRN for blood glucose, Infuse over: 5 minute(s), first dose 18 21:33:00 CDT, Unconscious patient: Repeat as ordered per protocol.  Dextrose 50% and Water  (50 mL vial/syringe): 25 mL, 12.5 gm =, form: Injection, IV Push, Once PRN for blood glucose, Infuse over: 5 minute(s), first dose 18 21:33:00 CDT, Unconscious patient: Look for other source of altered mental status.  Dextrose 50% and Water  (50 mL vial/syringe): 50 mL, 25 gm =, form: Injection, IV Push, As Directed PRN for blood glucose, Infuse over: 5 minute(s), first dose 18 21:33:00 CDT, Unconscious patient: Repeat as ordered per protocol.  Dextrose 50% in Water intravenous solution: 25 mL, 12.5 gm =, form: Injection, IV Push, As Directed PRN for blood glucose, Infuse over: 5 minute(s), first dose 18 21:33:00 CDT, Conscious patient.  Lactated Ringers Injection intravenous solution 1,000 mL: 1,000 mL, 1,000 mL, IV, 75 mL/hr, start date 18 20:00:00 CDT  Nexterone 360 mg/D5W 200 mL PREMIX 360 m mL, 200 mL, IV, 16.6 mL/hr, start date 18 19:57:00 CDT  Percocet 5/325: 1 tab(s), form: Tab, Oral, q6hr PRN for pain, first dose 18 20:00:00 CDT, mild/moderate  Zofran: 4 mg, form: Injection, IV Push, q4hr PRN for nausea, first dose 18 20:00:00 CDT, choose first if ordered with other treatments for nausea  Zosyn 3.375 gm (for IVPB): 3.375 gm, form: Infusion, IV Piggyback, q8hr, Infuse over: 4 hr, first dose 18 21:00:00 CDT  acetaminophen: 650 mg, form: Tab, Oral, q6hr PRN for fever, first dose 18 20:00:00 CDT, > 38.1 degrees Celsius  gatifloxacin 0.5% ophthalmic solution: 1 drop(s), form: Soln-Opth, Eye-Right,  Once, first dose 07/09/12 10:00:00 CDT, stop date 07/09/12 10:00:00 CDT, before discharge  glucagon: 1 mg, form: Injection, IM, q10min PRN for blood glucose, first dose 08/06/18 21:33:00 CDT, Conscious Patient with NO IV access available and BG < 45 mg/dl.  glucagon: 1 mg, form: Injection, IM, q10min PRN for blood glucose, first dose 08/06/18 21:33:00 CDT, Unconscious patient: Patient with NO IV access available and BG < 70 mg/dl.  insulin lispro: 2-14 units, form: Injection, Subcutaneous, As Directed PRN for blood glucose, first dose 08/06/18 21:33:00 CDT  morphine: 4 mg, form: Injection, IV, q4hr PRN for pain, first dose 08/06/18 20:00:00 CDT, severe OR mild/moderate if NPO  vancomycin: 1,500 mg, IV Piggyback, q18hr, Infuse over: 2.5 hr, first dose 08/06/18 22:00:00 CDT  Pending Complete  midazolam: 2 mg, form: Injection, IV Push, q5min, Order duration: 2 dose(s), first dose 07/09/12 8:20:00 CDT, stop date 07/09/12 8:29:00 CDT, (up to 5 mg for moderate anxiety)  Prescriptions  Prescribed  meropenem 500 mg intravenous injection: 500 mg, IV, q8hr, X 10 day(s), # 1 EA, 0 Refill(s), other reason (Rx)  Documented Medications  Documented  Coenzyme Q10 200 mg oral capsule: 200 mg, Oral, Daily, 0 Refill(s)  Lopressor 1 mg/mL injectable solution: 10 mg = 10 mL, IV Push, q4hr, PRN PRN tachycardia, 0 Refill(s)  Mobic 7.5 mg oral tablet: 7.5 mg = 1 tab(s), Oral, BID, # 30 tab(s), 0 Refill(s)  Norco 7.5 mg-325 mg oral tablet: 1 tab(s), PEG Tube, q6hr, PRN PRN pain, moderate, 0 Refill(s)  Protonix 40 mg ORAL enteric coated tablet: 40 mg = 1 tab(s), Oral, Daily, 0 Refill(s)  acetaminophen 325 mg oral tablet: 650 mg = 2 tab(s), PEG Tube, q6hr, PRN PRN pain, mild, 0 Refill(s)  aspirin 81 mg oral tablet: 81 mg = 1 tab(s), Oral, Daily, # 30 tab(s), 0 Refill(s)  atorvastatin 40 mg oral tablet: 40 mg = 1 tab(s), Oral, Daily, # 90 tab(s), 0 Refill(s)  carvedilol CR 10 mg oral capsule, Extended Release: 10 mg = 1 cap(s), Oral, Daily, #  30 cap(s), 0 Refill(s)  furosemide 20 mg oral tablet: 20 mg = 1 tab(s), PEG Tube, Daily, # 30 tab(s), 0 Refill(s)  hydrOXYzine pamoate 25 mg oral capsule: 25 mg = 1 cap(s), Oral, Daily, PRN PRN for anxiety, 0 Refill(s)  ipratropium 500 mcg/2.5 mL inhalation solution: NEB, q6hr Resp, 0 Refill(s)  metoprolol tartrate 25 mg oral tab: 50 mg = 2 tab(s), PEG Tube, BID, 0 Refill(s)  multivitamin with minerals (Adult Tab): 1 tab(s), Oral, Daily, # 30 tab(s), 0 Refill(s)      Physical Examination   General:  No acute distress, flat; avoids eye contact, Not alert and oriented.    Eye:  Pupils are equal, round and reactive to light, Extraocular movements are intact.    Neck:  Supple, Non-tender.    Respiratory:  Lungs are clear to auscultation, Respirations are non-labored.    Cardiovascular:  Normal rate, Regular rhythm.    Gastrointestinal:  Soft, Non-tender.       Vital Signs (last 24 hrs)_____  Last Charted___________  Temp Oral     36.7 DegC  (AUG 06 16:32)  Heart Rate Peripheral   86 bpm  (AUG 06 18:00)  Resp Rate         18 br/min  (AUG 06 16:32)  SBP      122 mmHg  (AUG 06 18:00)  DBP      72 mmHg  (AUG 06 18:00)  SpO2      96 %  (AUG 06 18:00)     Musculoskeletal:  Normal range of motion, weak bilaterally.    Integumentary:  PEG site red but dry; no signs of infection  vertical incison healing wiht some drainage; .    Neurologic:  not oriented to place, time situation, Not alert.    Psychiatric:  Cooperative, poor insight.       Review / Management   Results review:     Labs (Last four charted values)  WBC                  H 16.5 (AUG 06) H 14.1 (AUG 05) H 11.8 (AUG 04) H 13.8 (AUG 03)   Hgb                  L 11.7 (AUG 06) L 10.8 (AUG 05) L 9.8 (AUG 04) L 10.6 (AUG 03)   Hct                  L 37.2 (AUG 06) L 33.7 (AUG 05) L 31.2 (AUG 04) L 32.2 (AUG 03)   Plt                  L 121 (AUG 06) L 97 (AUG 05) L 89 (AUG 04) L 83 (AUG 03)   Na                   145 (AUG 06) 141 (AUG 05) 138 (AUG 04) 138 (AUG 03)   K                     3.9 (AUG 06) 3.5 (AUG 05) 3.6 (AUG 04) 4.0 (AUG 03)   CO2                  21.4 (AUG 06) 23.6 (AUG 05) 21.7 (AUG 04) L 20.7 (AUG 03)   Cl                   H 111 (AUG 06) 107 (AUG 05) 106 (AUG 04) 105 (AUG 03)   Cr                   H 1.32 (AUG 06) 1.15 (AUG 05) 1.08 (AUG 04) H 1.37 (AUG 03)   BUN                  H 39.9 (AUG 06) H 30.0 (AUG 05) H 32.0 (AUG 04) H 37.3 (AUG 03)   Glucose Random       H 180 (AUG 06) H 176 (AUG 05) H 148 (AUG 04) H 130 (AUG 03) .    * Final Report *    Reason For Exam  Bowel Obstruction    Radiology Report  CT Abdomen and Pelvis W W/O Contrast     REASON FOR STUDY: Bowel Obstruction      TECHNIQUE: CT imaging was performed of the abdomen and pelvis before  and after the administration of intravenous contrast. Dose length  product is 2410 mGycm. Automatic exposure control, adjustment of mA/kV  or iterative reconstruction technique was used to limit radiation  dose.     COMPARISON: CT from 7/9/2018      FINDINGS: Residual contrast within the stomach produces streak  artifact, mildly limiting evaluation of the upper abdomen.     Liver: No definitive focal findings.     Gallbladder and biliary tree: Previous cholecystectomy. No intra or  extrahepatic biliary ductal dilation.      Pancreas: Normal.     Spleen: Normal.     Adrenals: Normal.     Kidneys and ureters: No renal or ureteral stone. The kidneys enhance  symmetrically. 2 subcentimeter left renal hypodensities are too small  to assess. No hydronephrosis.     Bladder: Decompressed with a catheter.     Reproductive organs: Mild enlargement of the prostate.     Stomach/bowel: Percutaneous gastrostomy tube. Segments of dilated  small bowel within the central abdomen measure up to 5 cm in diameter.  Small bowel anastomosis noted within the right abdomen with distal  segments of small bowel normal in caliber. Fluid and gas throughout  the colon. Appendix not seen.         Lymph nodes: A previously seen gastrohepatic lymph node  is largely  obscured by streak artifact today. Heterogeneous mesenteric mass  measures approximately 10 x 6 cm in transverse diameter compared to 12  x 8 cm before.     Peritoneum: Trace ascites.     Vessels: Moderate vascular calcifications. No abdominal aortic  aneurysm.      Abdominal wall: Small fat-containing left inguinal hernia.     Lung bases: The heart is enlarged. Patchy densities within the lung  bases likely reflect atelectasis.     Bones: No acute osseous findings.         IMPRESSION:   1. Multiple segments of dilated small bowel with possible transition  point in the right abdomen. However, there is fluid and gas throughout  the colon with areas of colonic distention. Difficult to differentiate  between partial/early small bowel obstruction and ileus.  2. Heterogeneous mesenteric mass appears mildly smaller since  7/9/2018.  3. Trace ascites.       Signature Line  Electronically Signed By: Capo Gomez MD  Date/Time Signed: 08/05/2018 13:47    Technical Comments  Did Patient have reaction? No   Consent Signed? Yes   Home Medication Reviewed? Yes        This document has an image    Result type: CT Abdomen and Pelvis W W/O Contrast  Result date: August 05, 2018 12:14 CDT  Result status: Auth (Verified)  Result title: CT Abdomen and Pelvis W W/O Contrast  Performed by: Capo Gomez MD on August 05, 2018 13:27 CDT  Verified by: Capo Gomez MD on August 05, 2018 13:47 CDT  Encounter info: [id number], [facility], Swingbed, 8/1/2018 - 8/6/2018           Impression and Plan     SVT with wide complex tachycardia: appreciate cards input; initially thought it was due to Ventricular tachycardia; continue amiodarone; Transfer back to higher level of care for surgical evaluation/ID/renal  pt with ischemic bowel secondary to tumor invasion and cholecystitis: completes abx with zyvox today; started feedings with PEG: DC the clinimix; will DC the picc line soon if not needed. 8/3: seems to be doing  well; if CRP trends up/fever occurs/increased abd pain will have low threshold for repeating CT abdomen. 8/5: likely element of SBO; will resume TF at 45cc/hr for now and reeval in AM if tolerating and increase slowly to goal; 8/6: CT noted as above and repeat KUB shows no new issues; he has PSBO which is concerning; will rest his bowels today; place on fluids and restart in 1-2 days at low volume; may need to restart TPN ; may need to refer back to surgeon if worsening for exploration; unfortunately cannot perform indium scan;   AMS: likely due to reglan; will DC for now; cant rule out infection as source;j unlikley to be brain metastasis with whole body PET in june of 2018; CT brain if no improvement after stopping reglan.  Elevation WBC: unclear etiology; very concerning but will relook at other sources; I dont think he would have lymphoma brain metastasis in view of prior workup and clinical presentation;  check UA and draw blood cultures; CRP is flat; Line may be issue; if blood cultures come back postitive will DC   thrombocytopenia: likley due to zyvox. OK to DC after todays dose; cant rule out other sources; 8/3: hopefully due to zyvox which has been stopped; repeat labs in AM; 8/6: good improvement; likely was due to zyvox;   apathetic; Likely underlying depression/dementia and or personaltiy disorder; 8/3: started cymbalta; BC;   lymphoma: not a candidate for treatment at this time. FU with oncology after DC  DEbility: consult PT and OT; DW wife that he may not get to independent status  Stage 3 sacral decubutis: consult wound care Dr dewey; 8/3: defer need for specialty bed to dr dewey; notified wound care nurse to call dr dewey;   time spent in DC process 55 minutes

## 2022-04-30 NOTE — H&P
Patient:   Jermaine Abdalla            MRN: [id number]            FIN: [id number]               Age:   78 years     Sex:  Male     :  1940   Associated Diagnoses:   None   Author:   Ryan Claros MD      History of Present Illness   CC:Apathy  pt appears cj very apathetic; wife states he has been that way most of his life but it has been worse since his illness.  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 The patient had a section of small bowel resected.  It was about a foot long according to dr Hammonds operative notes;  He then had a PEG placed on : he has had progressive platelet reduction but has been on zyvox. He seems to be very sensitive to pain;  they have not been using his PEG but he has been on surgical liquids and clinimix prior to arriving. He has a  history of diabetes and diffuse large B-cell lymphoma, also with a history of AFib and SVT.  His defibrillator went off while their.  He did develop metabolic acidosis.  He did have significant hypotension for a prolonged period of time.  As a result, he has developed FANNY due to ischemic ATN. this has since stabilized; with meds and care. He is a candidate for further treatment of his lymophoma if his performance status improves.             Review of Systems   Constitutional:  Fever, Weakness, No chills, No sweats.    Eye:  No blurring, No double vision.    Ear/Nose/Mouth/Throat:  No nasal congestion, No sore throat.    Respiratory:  Shortness of breath, Cough, No sputum production.    Cardiovascular:  Tachycardia, No chest pain, No palpitations, No bradycardia.    Gastrointestinal:  Nausea, Vomiting, Constipation, No diarrhea.    Genitourinary:  No dysuria, No hematuria.    Hematology/Lymphatics:  No bruising  tendency, No bleeding tendency.    Endocrine:  No excessive thirst, No polyuria.    Immunologic:  Not immunocompromised, No recurrent fevers.    Musculoskeletal:  No neck pain, No joint pain.    Integumentary:  No rash, No pruritus.    Neurologic:  Confusion, No abnormal balance.    Psychiatric:  Depression, No anxiety, Not delusional, No hallucinations.    All other systems are negative      Health Status   Current medications:  (Selected)   Inpatient Medications  Ordered  Claritin 10 mg oral tablet: 10 mg, form: Tab, Oral, Daily, first dose 08/02/18 9:00:00 CDT  Dextrose 50% and Water  (50 mL vial/syringe): 25 mL, 12.5 gm =, form: Injection, IV Push, As Directed PRN for blood glucose, Infuse over: 5 minute(s), first dose 08/01/18 18:33:00 CDT, Unconscious patient: Repeat as ordered per protocol.  Dextrose 50% and Water  (50 mL vial/syringe): 25 mL, 12.5 gm =, form: Injection, IV Push, Once PRN for blood glucose, Infuse over: 5 minute(s), first dose 08/01/18 18:33:00 CDT, Unconscious patient: Look for other source of altered mental status.  Dextrose 50% and Water  (50 mL vial/syringe): 50 mL, 25 gm =, form: Injection, IV Push, As Directed PRN for blood glucose, Infuse over: 5 minute(s), first dose 08/01/18 18:33:00 CDT, Unconscious patient: Repeat as ordered per protocol.  Dextrose 50% in Water intravenous solution: 25 mL, 12.5 gm =, form: Injection, IV Push, As Directed PRN for blood glucose, Infuse over: 5 minute(s), first dose 08/01/18 18:33:00 CDT, Conscious patient.  Lasix 40 mg oral tablet: 40 mg, form: Tab, Oral, Daily, first dose 08/02/18 9:00:00 CDT  Mucinex 600 mg oral tablet, extended release: 1,200 mg, form: Tab-CR, Oral, BID, first dose 08/01/18 21:00:00 CDT  Norco 7.5 mg-325 mg oral tablet: 1 tab(s), form: Tab, Oral, q6hr PRN for pain, moderate, first dose 08/01/18 13:18:00 CDT  Protonix 40 mg ORAL enteric coated tablet: 40 mg, form: Tab-EC, Oral, Daily, first dose 08/01/18 14:18:00 CDT  Zyvox 2 mg/mL  intravenous solution: 600 mg, form: Infusion, IV Piggyback, BID, Infuse over: 90 minute(s), Order duration: 4 dose(s), first dose 08/01/18 21:00:00 CDT, stop date 08/03/18 20:59:00 CDT  acetaminophen 325 mg oral tablet: 650 mg, form: Tab, Oral, q6hr PRN for pain, mild, first dose 08/01/18 13:18:00 CDT, or fever or headace  gatifloxacin 0.5% ophthalmic solution: 1 drop(s), form: Soln-Opth, Eye-Right, Once, first dose 07/09/12 10:00:00 CDT, stop date 07/09/12 10:00:00 CDT, before discharge  glucagon: 1 mg, form: Injection, IM, q10min PRN for blood glucose, first dose 08/01/18 18:33:00 CDT, Unconscious patient: Patient with NO IV access available and BG < 70 mg/dl.  insulin lispro: 2-14 units, form: Injection, Subcutaneous, As Directed PRN for blood glucose, first dose 08/01/18 18:33:00 CDT  ipratropium neb 0.02% UD: 2.5 mL, 0.5 mg =, form: Soln-Inh, NEB, q6hr Resp, first dose 08/01/18 14:00:00 CDT  Pending Complete  midazolam: 2 mg, form: Injection, IV Push, q5min, Order duration: 2 dose(s), first dose 07/09/12 8:20:00 CDT, stop date 07/09/12 8:29:00 CDT, (up to 5 mg for moderate anxiety)  Prescriptions  Prescribed  Eliquis 5 mg oral tablet: 5 mg = 1 tab(s), Oral, BID, # 60 tab(s), 0 Refill(s), other reason (Rx)  Documented Medications  Documented  Claritin 10 mg oral tablet: 10 mg = 1 tab(s), Oral, Daily, 0 Refill(s)  Dilaudid 2 mg/mL injectable solution: 0.5 mg = 0.25 mL, IV, q4hr, PRN PRN pain, moderate, 0 Refill(s)  Lasix 40 mg oral tablet: 40 mg = 1 tab(s), Oral, Daily, 0 Refill(s)  Megace: 40 mg = 1 tab(s), Oral, Daily, 0 Refill(s)  Mucinex 600 mg oral tablet, extended release: 1,200 mg = 2 tab(s), Oral, BID, 0 Refill(s)  Norco 7.5 mg-325 mg oral tablet: 1 tab(s), Oral, q4hr, PRN PRN pain, 0 Refill(s)  Primaxin 250 mg/ mL: 250 mg = 1 EA, IV, q6hr, 0 Refill(s)  Relistor 12 mg/0.6 mL subcutaneous solution: 12 mg = 0.6 mL, Subcutaneous, Every other day, 0 Refill(s)  Vitamin B Complex oral capsule: 1 tab(s),  Oral, Daily, 0 Refill(s)  acetaminophen 325 mg oral tablet: 650 mg = 2 tab(s), Oral, q6hr, PRN PRN fever, 0 Refill(s)  acetaminophen 500 mg oral tablet: 1,000 mg = 2 tab(s), Oral, q6hr, PRN PRN pain, 0 Refill(s)  acetylcysteine 20% inhalation solution: 200 mg = 1 mL, NEB, q6hr Resp, 0 Refill(s)  allopurinol 300 mg oral tablet: 300 mg = 1 tab(s), Oral, Daily, 0 Refill(s)  aspirin 81 mg oral tablet: 81 mg = 1 tab(s), Oral, Daily, 0 Refill(s)  atorvastatin 40 mg oral tablet: 20 mg = 0.5 tab(s), Oral, At Bedtime, # 30 tab(s), 0 Refill(s)  insulin lispro 100 units/mL injectable solution: 0 Refill(s)  ipratropium 500 mcg/2.5 mL inhalation solution: NEB, q6hr Resp, 0 Refill(s)  linezolid: 600 mg = 300 mL, IV Piggyback, q12hr, 0 Refill(s)  metoprolol tartrate 1 mg/mL injectable solution: 5 mg = 5 mL, IV Push, q6hr, 0 Refill(s)  metoprolol tartrate 25 mg oral tab: 25 mg = 1 tab(s), Oral, BID, 0 Refill(s)  pantoprazole 20 mg ORAL EC-Tablet: 20 mg = 1 tab(s), Oral, Daily, 0 Refill(s)  pantoprazole: 20 mg, IV Slow, Daily, 0 Refill(s)  scopolamine 1.5 mg transdermal film, extended release: 1.5 mg = 1 patch(es), TD, q72hr, 0 Refill(s)      Histories   Past Medical History:    Active  Wears glasses (417065906)  Hypertension (79103397)  Elevated cholesterol ([id number])  heart disease-pacemaker/defibrillator ([id number])  Chronic back pain (521253538)  Cataract (340890995)  able to lie flat ([id number])  Resolved  Pacemaker ([id number]):  Resolved.  Defibrillator (81133881):  Resolved.  Colonoscopy ([id number]):  Resolved.  Cardiovascular disease ([id number]):  Resolved.  Cardiomyopathy (280920396):  Resolved.   Procedure history:    PEG Tube Insertion Initial performed by Isiah Hammonds MD on 7/26/2018 at 78 Years.  Comments:  7/26/2018 19:11 - Jena Watson RN  auto-populated from documented surgical case  Cholecystectomy Laparoscopic performed by  Isiah Hammonds MD on 7/14/2018 at 78 Years.  Comments:  7/14/2018 15:14 - Minda Chauhan RN  auto-populated from documented surgical case  Cholangiogram (Surgery) performed by Isiah Hammonds MD on 7/14/2018 at 78 Years.  Comments:  7/14/2018 15:14 - Minda Chauhan RN  auto-populated from documented surgical case  Small Bowel Resection performed by Isiah Hammonds MD on 7/14/2018 at 78 Years.  Comments:  7/14/2018 15:14 - Minda Chauhan RN  auto-populated from documented surgical case  Biopsy Bone Marrow Aspiration (.) performed by Tracey Upton MD on 6/21/2018 at 78 Years.  Comments:  6/21/2018 11:40 - Jose J Song RN  auto-populated from documented surgical case  Polypectomy performed by CHAVEZ Josue MD on 9/8/2015 at 75 Years.  Comments:  9/8/2015 08:31 - Tg Ceron RN  auto-populated from documented surgical case  Colonoscopy performed by CHAVEZ Josue MD on 9/8/2015 at 75 Years.  Comments:  9/8/2015 08:31 - Tg Ceron RN  auto-populated from documented surgical case  Remove and Replace ICD Gen Dual Lead performed by Bayron Stevens MD on 4/17/2014 at 74 Years.  Comments:  4/17/2014 10:47 - Katerin Collado RN  auto-populated from documented surgical case  pacemaker/defibrillator in 2006 at 66 Years.  back surgery in 1977 at 37 Years.  Appendectomy (SNOMED CT [id number]).   Social History        Social & Psychosocial Habits    Alcohol  06/21/2012 Risk Assessment: Denies Alcohol Use    09/08/2015  Use: Past    Type: Beer    Comment: stopped 8-9 years - 09/08/2015 06:50 - Meka Rm RN    Employment/School  06/21/2012  Highest education: High school    Home/Environment  06/25/2012  Lives with: Spouse    Living situation: Home/Independent    Substance Abuse  06/08/2018  Use: Never    Tobacco  06/21/2012 Risk Assessment: Denies Tobacco Use    06/08/2018  Use: Never smoker  .        Physical Examination   General:  Alert and oriented, No acute  distress, flat; avoids eye contact.    Eye:  Pupils are equal, round and reactive to light, Extraocular movements are intact.    HENT:  Normocephalic, Oral mucosa is moist.    Neck:  Supple, Non-tender.    Respiratory:  Lungs are clear to auscultation, Respirations are non-labored.    Cardiovascular:  Normal rate, Regular rhythm.    Gastrointestinal:  Soft, Non-tender.       Vital Signs (last 24 hrs)_____  Last Charted___________  Temp Oral     36.5 DegC  (AUG 02 12:00)  Heart Rate Peripheral   100 bpm  (AUG 02 12:00)  Resp Rate         20 br/min  (AUG 01 21:00)  SBP      94 mmHg  (AUG 02 12:00)  DBP      64 mmHg  (AUG 02 12:00)  SpO2      96 %  (AUG 02 12:00)  Weight      77.3 kg  (AUG 02 05:00)     Genitourinary:  No costovertebral angle tenderness.    Lymphatics:  No lymphadenopathy neck, axilla, groin.    Musculoskeletal:  Normal range of motion, weak bilaterally.    Integumentary:  PEG site red but dry; no signs of infection  vertical incison healing wiht some drainage; .    Neurologic:  Alert, not oriented to place, time situation.    Psychiatric:  Cooperative, poor insight.       Review / Management   Results review:     No qualifying data available.    Laboratory Results   Last 5 Days Lab Results : PowerNote Discrete Results   8/1/2018 4:14 CDT        WBC                       13.7 x10(3)/mcL  HI                             RBC                       3.79 x10(6)/mcL  LOW                             Hgb                       10.2 gm/dL  LOW                             Hct                       32.3 %  LOW                             Platelet                  94 x10(3)/mcL  CRIT                             MCV                       85 fL                             MCH                       27 pg                             MCHC                      32 gm/dL                             RDW                       17.2 %  HI                             MPV                       11.1 fL  HI                              Abs Neut                  10.5 x10(3)/mcL  HI                             Neutro Auto               76 %  HI                             Lymph Auto                9.30 %  LOW                             Mono Auto                 13 %  HI                             Eos Auto                  0 %                             Abs Eos                   0.0 x10(3)/mcL                             Basophil Auto             0 %                             Abs Neutro                10.5 x10(3)/mcL  HI                             Abs Lymph                 1.3 x10(3)/mcL                             Abs Mono                  1.8 x10(3)/mcL  HI                             Abs Baso                  0.0 x10(3)/mcL                             Hypochrom                 Trace                             Platelet Est              Decreased                             Anisocyte                 Trace                             RBC Morph                 Abnormal                             Sodium Lvl                134 mmol/L  LOW                             Potassium Lvl             4.1 mmol/L                             Chloride                  101 mmol/L                             CO2                       21 mmol/L                             Calcium Lvl               9.1 mg/dL                             Magnesium Lvl             1.7 mg/dL  LOW                             Glucose Lvl               150 mg/dL  HI                             BUN                       38 mg/dL  HI                             Creatinine                1.05 mg/dL                             BUN/Creat Ratio           36  NA                             eGFR-AA                   >60 mL/min/1.73 m2  NA                             eGFR-KELLY                  >60 mL/min/1.73 m2  NA           Impression and Plan   pt with ischemic bowel secondary to tumor invasion and cholecystitis: completes abx with zyvox today; started feedngs with PEG: DC the clinimix;  will DC the picc line soon if not needed.    thormbocytopenia: likley due to zyvox. 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 FU with oncology after DC  DEbility: consult PT and OT; DW wife that he may not get to independent status  Stage 3 sacral decubutis: consult wound care Dr dewey;